# Patient Record
Sex: MALE | Race: OTHER | HISPANIC OR LATINO
[De-identification: names, ages, dates, MRNs, and addresses within clinical notes are randomized per-mention and may not be internally consistent; named-entity substitution may affect disease eponyms.]

---

## 2020-09-05 ENCOUNTER — TRANSCRIPTION ENCOUNTER (OUTPATIENT)
Age: 55
End: 2020-09-05

## 2020-09-24 ENCOUNTER — EMERGENCY (EMERGENCY)
Facility: HOSPITAL | Age: 55
LOS: 1 days | Discharge: ANOTHER HOSPITAL | End: 2020-09-24
Attending: EMERGENCY MEDICINE | Admitting: EMERGENCY MEDICINE
Payer: COMMERCIAL

## 2020-09-24 ENCOUNTER — EMERGENCY (EMERGENCY)
Facility: HOSPITAL | Age: 55
LOS: 1 days | Discharge: HOME | End: 2020-09-24
Attending: EMERGENCY MEDICINE | Admitting: EMERGENCY MEDICINE
Payer: COMMERCIAL

## 2020-09-24 VITALS
OXYGEN SATURATION: 96 % | HEIGHT: 71 IN | TEMPERATURE: 99 F | HEART RATE: 66 BPM | SYSTOLIC BLOOD PRESSURE: 135 MMHG | RESPIRATION RATE: 15 BRPM | WEIGHT: 166.89 LBS | DIASTOLIC BLOOD PRESSURE: 88 MMHG

## 2020-09-24 VITALS
TEMPERATURE: 100 F | RESPIRATION RATE: 17 BRPM | HEART RATE: 70 BPM | OXYGEN SATURATION: 97 % | SYSTOLIC BLOOD PRESSURE: 162 MMHG | DIASTOLIC BLOOD PRESSURE: 99 MMHG

## 2020-09-24 VITALS
OXYGEN SATURATION: 97 % | HEIGHT: 71 IN | RESPIRATION RATE: 18 BRPM | DIASTOLIC BLOOD PRESSURE: 85 MMHG | HEART RATE: 70 BPM | WEIGHT: 166.89 LBS | TEMPERATURE: 100 F | SYSTOLIC BLOOD PRESSURE: 135 MMHG

## 2020-09-24 DIAGNOSIS — J32.9 CHRONIC SINUSITIS, UNSPECIFIED: ICD-10-CM

## 2020-09-24 DIAGNOSIS — Z20.828 CONTACT WITH AND (SUSPECTED) EXPOSURE TO OTHER VIRAL COMMUNICABLE DISEASES: ICD-10-CM

## 2020-09-24 DIAGNOSIS — R22.0 LOCALIZED SWELLING, MASS AND LUMP, HEAD: ICD-10-CM

## 2020-09-24 DIAGNOSIS — R50.9 FEVER, UNSPECIFIED: ICD-10-CM

## 2020-09-24 LAB
ANION GAP SERPL CALC-SCNC: 9 MMOL/L — SIGNIFICANT CHANGE UP (ref 9–16)
BASOPHILS # BLD AUTO: 0.04 K/UL — SIGNIFICANT CHANGE UP (ref 0–0.2)
BASOPHILS NFR BLD AUTO: 0.3 % — SIGNIFICANT CHANGE UP (ref 0–2)
BUN SERPL-MCNC: 15 MG/DL — SIGNIFICANT CHANGE UP (ref 7–23)
CALCIUM SERPL-MCNC: 9.5 MG/DL — SIGNIFICANT CHANGE UP (ref 8.5–10.5)
CHLORIDE SERPL-SCNC: 101 MMOL/L — SIGNIFICANT CHANGE UP (ref 96–108)
CO2 SERPL-SCNC: 29 MMOL/L — SIGNIFICANT CHANGE UP (ref 22–31)
CREAT SERPL-MCNC: 1.26 MG/DL — SIGNIFICANT CHANGE UP (ref 0.5–1.3)
EOSINOPHIL # BLD AUTO: 0.03 K/UL — SIGNIFICANT CHANGE UP (ref 0–0.5)
EOSINOPHIL NFR BLD AUTO: 0.2 % — SIGNIFICANT CHANGE UP (ref 0–6)
GLUCOSE SERPL-MCNC: 115 MG/DL — HIGH (ref 70–99)
HCT VFR BLD CALC: 44.8 % — SIGNIFICANT CHANGE UP (ref 39–50)
HGB BLD-MCNC: 14.9 G/DL — SIGNIFICANT CHANGE UP (ref 13–17)
IMM GRANULOCYTES NFR BLD AUTO: 0.4 % — SIGNIFICANT CHANGE UP (ref 0–1.5)
LYMPHOCYTES # BLD AUTO: 2.73 K/UL — SIGNIFICANT CHANGE UP (ref 1–3.3)
LYMPHOCYTES # BLD AUTO: 20.1 % — SIGNIFICANT CHANGE UP (ref 13–44)
MCHC RBC-ENTMCNC: 31.2 PG — SIGNIFICANT CHANGE UP (ref 27–34)
MCHC RBC-ENTMCNC: 33.3 GM/DL — SIGNIFICANT CHANGE UP (ref 32–36)
MCV RBC AUTO: 93.7 FL — SIGNIFICANT CHANGE UP (ref 80–100)
MONOCYTES # BLD AUTO: 1.14 K/UL — HIGH (ref 0–0.9)
MONOCYTES NFR BLD AUTO: 8.4 % — SIGNIFICANT CHANGE UP (ref 2–14)
NEUTROPHILS # BLD AUTO: 9.6 K/UL — HIGH (ref 1.8–7.4)
NEUTROPHILS NFR BLD AUTO: 70.6 % — SIGNIFICANT CHANGE UP (ref 43–77)
NRBC # BLD: 0 /100 WBCS — SIGNIFICANT CHANGE UP (ref 0–0)
PLATELET # BLD AUTO: 173 K/UL — SIGNIFICANT CHANGE UP (ref 150–400)
POTASSIUM SERPL-MCNC: 4.2 MMOL/L — SIGNIFICANT CHANGE UP (ref 3.5–5.3)
POTASSIUM SERPL-SCNC: 4.2 MMOL/L — SIGNIFICANT CHANGE UP (ref 3.5–5.3)
RBC # BLD: 4.78 M/UL — SIGNIFICANT CHANGE UP (ref 4.2–5.8)
RBC # FLD: 12.1 % — SIGNIFICANT CHANGE UP (ref 10.3–14.5)
SARS-COV-2 RNA SPEC QL NAA+PROBE: SIGNIFICANT CHANGE UP
SODIUM SERPL-SCNC: 139 MMOL/L — SIGNIFICANT CHANGE UP (ref 132–145)
WBC # BLD: 13.59 K/UL — HIGH (ref 3.8–10.5)
WBC # FLD AUTO: 13.59 K/UL — HIGH (ref 3.8–10.5)

## 2020-09-24 PROCEDURE — 70470 CT HEAD/BRAIN W/O & W/DYE: CPT | Mod: 26

## 2020-09-24 PROCEDURE — 99285 EMERGENCY DEPT VISIT HI MDM: CPT

## 2020-09-24 PROCEDURE — 99284 EMERGENCY DEPT VISIT MOD MDM: CPT

## 2020-09-24 PROCEDURE — 70487 CT MAXILLOFACIAL W/DYE: CPT | Mod: 26

## 2020-09-24 RX ORDER — SODIUM CHLORIDE 9 MG/ML
1000 INJECTION INTRAMUSCULAR; INTRAVENOUS; SUBCUTANEOUS ONCE
Refills: 0 | Status: COMPLETED | OUTPATIENT
Start: 2020-09-24 | End: 2020-09-24

## 2020-09-24 RX ORDER — VANCOMYCIN HCL 1 G
1250 VIAL (EA) INTRAVENOUS ONCE
Refills: 0 | Status: COMPLETED | OUTPATIENT
Start: 2020-09-24 | End: 2020-09-24

## 2020-09-24 RX ORDER — ACETAMINOPHEN 500 MG
975 TABLET ORAL ONCE
Refills: 0 | Status: COMPLETED | OUTPATIENT
Start: 2020-09-24 | End: 2020-09-24

## 2020-09-24 RX ADMIN — SODIUM CHLORIDE 1000 MILLILITER(S): 9 INJECTION INTRAMUSCULAR; INTRAVENOUS; SUBCUTANEOUS at 20:14

## 2020-09-24 RX ADMIN — SODIUM CHLORIDE 1000 MILLILITER(S): 9 INJECTION INTRAMUSCULAR; INTRAVENOUS; SUBCUTANEOUS at 18:15

## 2020-09-24 RX ADMIN — Medication 1250 MILLIGRAM(S): at 22:14

## 2020-09-24 RX ADMIN — Medication 166.67 MILLIGRAM(S): at 18:53

## 2020-09-24 RX ADMIN — Medication 975 MILLIGRAM(S): at 22:14

## 2020-09-24 NOTE — ED PROVIDER NOTE - CLINICAL SUMMARY MEDICAL DECISION MAKING FREE TEXT BOX
signed out to me -- nasal abscess with cellulitis, wbc 13k, covered with IV vancomycin, spoke with ENT fellow Dr. Alba, he recommended transfer to [facility] ED for eval, discussed with Dr. Boss who accepted patient for transfer. patient agrees with plan.

## 2020-09-24 NOTE — ED ADULT NURSE NOTE - OBJECTIVE STATEMENT
Received pt on stretcher no apparent distress. BIBA transferred from [facility] for ENT consult. Pt noted swelling and redness on his nose - CT was done - +abscess transferred here for ENT consult. No fever. with PIV on Rt AC g.20. Meds started in [facility] per notes,  -Pending MD canas.  -Plan of care explained.  -comfort and safety measures maintained.

## 2020-09-24 NOTE — ED ADULT NURSE NOTE - OTHER COMPLAINTS
pt transferred from [facility] for nasal abscess, pt reports nasal swelling and congestion and fevers x5 days, denies any injury to the site, speaking in full sentences, no respiratory distress present

## 2020-09-24 NOTE — ED PROVIDER NOTE - ATTENDING CONTRIBUTION TO CARE
Patient presenting with nasal swelling/redness and fever x 2d. VSS. + nasal swelling/ cellulitis. CTAP shows abscess. Covered with IV abx. TFR to [facility] ED for ENT eval.

## 2020-09-24 NOTE — ED CLERICAL - NS ED CLERK NOTE PRE-ARRIVAL INFORMATION; ADDITIONAL PRE-ARRIVAL INFORMATION
from [facility] for ENT for nasal abscess drainage. [facility] d/w ENT Dr. Mclaughlin

## 2020-09-24 NOTE — ED ADULT TRIAGE NOTE - OTHER COMPLAINTS
pt transferred from [facility] for nasal abscess, pt reports nasal swelling and congestion and fevers x5 days, denies any injury to the site, speaking in full sentences, no respiratory distress present

## 2020-09-24 NOTE — ED PROVIDER NOTE - ENMT, MLM
The patient's nose is diffusely swollen, erythematous and tender with crusting and scabbing along the medial aspect of the right naris.

## 2020-09-25 ENCOUNTER — APPOINTMENT (OUTPATIENT)
Dept: OTOLARYNGOLOGY | Facility: CLINIC | Age: 55
End: 2020-09-25

## 2020-09-25 ENCOUNTER — APPOINTMENT (OUTPATIENT)
Dept: OTOLARYNGOLOGY | Facility: CLINIC | Age: 55
End: 2020-09-25
Payer: COMMERCIAL

## 2020-09-25 VITALS
TEMPERATURE: 98.4 F | HEIGHT: 71 IN | WEIGHT: 167 LBS | OXYGEN SATURATION: 98 % | SYSTOLIC BLOOD PRESSURE: 135 MMHG | HEART RATE: 68 BPM | DIASTOLIC BLOOD PRESSURE: 87 MMHG | BODY MASS INDEX: 23.38 KG/M2

## 2020-09-25 DIAGNOSIS — B20 HUMAN IMMUNODEFICIENCY VIRUS [HIV] DISEASE: ICD-10-CM

## 2020-09-25 DIAGNOSIS — Z78.9 OTHER SPECIFIED HEALTH STATUS: ICD-10-CM

## 2020-09-25 DIAGNOSIS — Z83.3 FAMILY HISTORY OF DIABETES MELLITUS: ICD-10-CM

## 2020-09-25 PROBLEM — Z00.00 ENCOUNTER FOR PREVENTIVE HEALTH EXAMINATION: Status: ACTIVE | Noted: 2020-09-25

## 2020-09-25 PROCEDURE — 99283 EMERGENCY DEPT VISIT LOW MDM: CPT

## 2020-09-25 PROCEDURE — 31231 NASAL ENDOSCOPY DX: CPT

## 2020-09-25 PROCEDURE — 99204 OFFICE O/P NEW MOD 45 MIN: CPT | Mod: 25

## 2020-09-25 RX ORDER — MOXIFLOXACIN HYDROCHLORIDE TABLETS, 400 MG 400 MG/1
1 TABLET, FILM COATED ORAL
Qty: 20 | Refills: 0
Start: 2020-09-25 | End: 2020-10-04

## 2020-09-25 RX ORDER — IBUPROFEN 200 MG
400 TABLET ORAL ONCE
Refills: 0 | Status: DISCONTINUED | OUTPATIENT
Start: 2020-09-25 | End: 2020-09-28

## 2020-09-25 RX ORDER — HYDROCODONE BITARTRATE AND ACETAMINOPHEN 5; 325 MG/1; MG/1
5-325 TABLET ORAL
Qty: 30 | Refills: 0 | Status: ACTIVE | COMMUNITY
Start: 2020-09-25 | End: 1900-01-01

## 2020-09-25 RX ORDER — AZTREONAM 2 G
1 VIAL (EA) INJECTION
Qty: 20 | Refills: 0
Start: 2020-09-25 | End: 2020-10-04

## 2020-09-25 RX ORDER — BICTEGRAVIR SODIUM, EMTRICITABINE, AND TENOFOVIR ALAFENAMIDE FUMARATE 30; 120; 15 MG/1; MG/1; MG/1
TABLET ORAL
Refills: 0 | Status: ACTIVE | COMMUNITY

## 2020-09-25 RX ORDER — CIPROFLOXACIN LACTATE 400MG/40ML
500 VIAL (ML) INTRAVENOUS ONCE
Refills: 0 | Status: COMPLETED | OUTPATIENT
Start: 2020-09-25 | End: 2020-09-25

## 2020-09-25 RX ORDER — CLINDAMYCIN HYDROCHLORIDE 150 MG/1
150 CAPSULE ORAL EVERY 6 HOURS
Qty: 120 | Refills: 0 | Status: ACTIVE | COMMUNITY
Start: 2020-09-25 | End: 1900-01-01

## 2020-09-25 RX ADMIN — Medication 500 MILLIGRAM(S): at 00:35

## 2020-09-25 RX ADMIN — Medication 1 TABLET(S): at 00:35

## 2020-09-25 NOTE — PHYSICAL EXAM
[Nasal Endoscopy Performed] : nasal endoscopy was performed, see procedure section for findings [de-identified] : prominent erythema/edema for sev cm around the nasal tip; packing tape removed from a R nasal sill incision & tip repacked [Normal] : orientation to person, place, and time: normal [FreeTextEntry2] : as noted [de-identified] : as noted

## 2020-09-25 NOTE — DATA REVIEWED
[de-identified] : ER results: elevated WBC w/ a left shift; no gram stain found [de-identified] : CT 9/24/20: Nasal tip & columella abscess

## 2020-09-25 NOTE — CONSULT NOTE ADULT - SUBJECTIVE AND OBJECTIVE BOX
CC: nasal abscess    HPI: 55M w/ Pmhx of HIV (on HAART, last VL undetectable) presenting with 3 days of nose progressive edema and erythema to [facility] ED.  pt underwent CT scan there and was found to have a small collection in the nasal tip.  Pt was transferred to [facility] ED for further management.  Pt states that he is not sure how the nose swelling starting and denies known pimple or skin cut. Pt denies further episodes of abscess and any abx treatment for this event prior to presentation.  At time of ENT evaluation at bedside, pt was AFVSS and auto draining purulence from right nasal ala.      PAST MEDICAL & SURGICAL HISTORY:  HIV (human immunodeficiency virus infection)      Allergies    No Known Allergies    Intolerances      MEDICATIONS  (STANDING):    MEDICATIONS  (PRN):      Social History: denies toxic habits X 3    ROS:   ENT: all negative except as noted in HPI     Vital Signs Last 24 Hrs  T(C): 37.6 (24 Sep 2020 22:40), Max: 38 (24 Sep 2020 22:17)  T(F): 99.6 (24 Sep 2020 22:40), Max: 100.4 (24 Sep 2020 22:17)  HR: 70 (24 Sep 2020 22:40) (66 - 70)  BP: 135/85 (24 Sep 2020 22:40) (135/85 - 162/99)  BP(mean): --  RR: 18 (24 Sep 2020 22:40) (15 - 18)  SpO2: 97% (24 Sep 2020 22:40) (96% - 98%)                          14.9   13.59 )-----------( 173      ( 24 Sep 2020 18:20 )             44.8    09-24    139  |  101  |  15  ----------------------------<  115<H>  4.2   |  29  |  1.26    Ca    9.5      24 Sep 2020 18:20         PHYSICAL EXAM:  Gen: NAD, A+OX3 and mentating well  Skin: skin overlying nose erythematous and edematous  Head: Normocephalic, Atraumatic  Face: edema and erythema of nasal tip with fluctuance. Parotid glands soft without mass  Eyes: no scleral injection  Nose: right nare with crusting and auto-draining of purulence and nasal tip fluctuance.  skin overlying nasal tip edematous and erythematous.  no septal fluctuance.    Mouth: No Stridor / Drooling / Trismus.  Mucosa moist, tongue/uvula midline, oropharynx clear  Neck: Flat, supple, no lymphadenopathy, trachea midline, no masses  Lymphatic: No lymphadenopathy  Resp: breathing easily, no stridor  Neuro: facial nerve intact, no facial droop    Procedure: Wound exploration and decompression of abscess.  R/B/A of procedure discussed at length and all questions answered and patient agreed to proceed.  Pt prepped and draped in sterile fashion with betadine solution.  2cc of lidocaine 2% with 1:100,000 epinephrine injected in a wheel surrounding area od auto-draining.  crusting removed and wound probed with a sterile cotton swab.  2 cc of purulence expressed and sent for culture.  2cm wick of iodoform placed into wound to prevent closure and promote drainage.  Pt tolerated procedure well without any complications.      IMAGING/ADDITIONAL STUDIES: Anterior nasal abscess, as above, with surrounding cellulitis. No nasal septal involvement, aside from the columella. No deep facial or orbital infection. No bony erosion/destruction.    A/P: 55 M w/ HIV well controlled and anterior nasal abscess now s/p decompression and packing.  pt AFVSS.   - Recommend mupirocen ointment to nares   - recommend warm compresses to nose   - Recommend PO abx with MRSA coverage and Ciprofloxacin   - Pt offered admission but preferred outpatient treatment at this time.   Recommend close follow up with Dr. Davis tomorrow - pt to call  in AM.    - Strict ED return precautions including Fever, change in mentation, progressing abscess, etc   - Please page ENT with any questions or concerns.

## 2020-09-25 NOTE — ASSESSMENT
[FreeTextEntry1] : Discussed reasons to go straight to the ER over the weekend; f/u 2d. Materials provided for BID packing changes\par  Reference #: [id number]

## 2020-09-25 NOTE — PROCEDURE
[FreeTextEntry6] : Indication: requirement for exam not possible via anterior rhinoscopy; abscess\par After verbal consent and the administration of an aerosolized phenylephrine/lidocaine mix, examination was performed with a flexible endoscope. Findings:\par Septum: bilat devn\par Mucosa: normal\par Polyposis: not present\par Inferior turbinates: unremarkable\par Middle and superior turbinates: normal\par Inferior meatus: unremarkable\par Middle meatus: unremarkable\par Superior meatus: unremarkable\par Speno-ethmoidal recess: unremarkable\par Nasopharynx: unremarkable\par Secretions: dried purulent anterior only

## 2020-09-25 NOTE — ED PROVIDER NOTE - CARE PROVIDER_API CALL
Mitzi Dvais  OTOLARYNGOLOGY  [address] [place] [address], [address]  [place], [place] [address]  Phone: [phone]  Fax: [phone]  Follow Up Time:

## 2020-09-25 NOTE — ED PROVIDER NOTE - PATIENT PORTAL LINK FT
You can access the FollowMyHealth Patient Portal offered by [facility] by registering at the following website: [email or web address]. By joining [employer] [employer]’s FollowMyHealth portal, you will also be able to view your health information using other applications (apps) compatible with our system.

## 2020-09-25 NOTE — ED PROVIDER NOTE - PHYSICAL EXAMINATION
CONSTITUTIONAL: Awake, alert and in no apparent distress.  HEENT: Head is atraumatic. Eyes clear bilaterally, normal EOMI. Airway patent. swelling to tip of nose w erythema, no septal fluctuance  CARDIAC: Normal rate, regular rhythm.  Heart sounds S1, S2.   RESPIRATORY: Breath sounds clear and equal bilaterally. no tachypnea, respiratory distress.   GASTROINTESTINAL: Abdomen soft, non-tender, no guarding, distension.  MUSCULOSKELETAL: Spine appears normal, no midline spinal tenderness, range of motion is not limited, no muscle or joint tenderness. no bony tenderness.   NEUROLOGICAL: Alert, no focal deficits, no motor or sensory deficits.  SKIN: Skin normal color for race, warm, dry and intact. No evidence of rash.  PSYCHIATRIC: Normal mood and affect. no apparent risk to self or others.

## 2020-09-25 NOTE — HISTORY OF PRESENT ILLNESS
[de-identified] : 3d ago developed pain & swelling of his nose which progressed until he went to the ER yesterday where a scan was done and he reports that he had 3cc of purulence liberated from his nasal tip via an intranasal incision. He was given IV abx x2. He has not started orals but has a rx; fevers yesterday but not today and the redness & swelling has decreaed significantly. Hx of some sinus infections but none in the past year. In general no nasal dyspnea except for some springtime allergies. \par HIV pos on Biktarvy and reports that his viral load was undetectable late last year. \par \par Covid-19 screening questions: \par   Sick contacts: no\par   Recent international travel: no\par   Shortness of breath: no\par   New or productive cough: no\par   Fevers/chills/night sweats: no\par   COVID-19 testing: neg swab & Ab 3 wks ago

## 2020-09-25 NOTE — ED PROVIDER NOTE - OBJECTIVE STATEMENT
hx of HIV, VLUD referred to ED for concern of nasal abscess, pt states redness to tip of nose for one day w assoc drainage, no f/c, headache, neck pain, Pt has not tried anything for symptoms, no other aggravating or relieving factors.

## 2020-09-28 ENCOUNTER — APPOINTMENT (OUTPATIENT)
Dept: OTOLARYNGOLOGY | Facility: CLINIC | Age: 55
End: 2020-09-28
Payer: COMMERCIAL

## 2020-09-28 VITALS
OXYGEN SATURATION: 98 % | TEMPERATURE: 98.3 F | BODY MASS INDEX: 23.38 KG/M2 | HEIGHT: 71 IN | WEIGHT: 167 LBS | DIASTOLIC BLOOD PRESSURE: 69 MMHG | SYSTOLIC BLOOD PRESSURE: 110 MMHG | HEART RATE: 64 BPM

## 2020-09-28 PROCEDURE — 99214 OFFICE O/P EST MOD 30 MIN: CPT

## 2020-09-28 NOTE — HISTORY OF PRESENT ILLNESS
[de-identified] : f/u a nasal tip MRSA abscess & cellulitis that began 6d ago & was packed in the ED; he's been doing BID packing changes but was unable to continue this morning. His pain level has decreased tremendously w/ less erythema.. Hx of some sinus infections but none in the past year. In general no nasal dyspnea except for some springtime allergies. \par HIV pos on Biktarvy and reports that his viral load was undetectable late last year. \par \par Covid-19 screening questions: \par   Sick contacts: no\par   Recent international travel: no\par   Shortness of breath: no\par   New or productive cough: no\par   Fevers/chills/night sweats: no\par   COVID-19 testing: neg swab & Ab 3 wks ago

## 2020-09-28 NOTE — ASSESSMENT
[FreeTextEntry1] : Improving significantly; may stop packing now but continue Clinda. Discussed home hygeine measures.

## 2020-09-28 NOTE — PHYSICAL EXAM
[de-identified] : much improved erythema/edema of the nasal tip & no further drg from the R sill incision site. Mild desquamation of the tip.  [Normal] : orientation to person, place, and time: normal [FreeTextEntry2] : as noted [de-identified] : as noted

## 2020-09-28 NOTE — DATA REVIEWED
[de-identified] : ER results: elevated WBC w/ a left shift; no gram stain found\par Cx 9/25: MRSA sensi to clinda [de-identified] : CT 9/24/20: Nasal tip & columella abscess

## 2020-09-30 PROBLEM — B20 HUMAN IMMUNODEFICIENCY VIRUS [HIV] DISEASE: Chronic | Status: ACTIVE | Noted: 2020-09-24

## 2020-10-02 LAB — BACTERIA WND CULT: ABNORMAL

## 2020-10-05 ENCOUNTER — APPOINTMENT (OUTPATIENT)
Dept: OTOLARYNGOLOGY | Facility: CLINIC | Age: 55
End: 2020-10-05
Payer: COMMERCIAL

## 2020-10-05 VITALS
HEART RATE: 55 BPM | WEIGHT: 167 LBS | BODY MASS INDEX: 23.38 KG/M2 | SYSTOLIC BLOOD PRESSURE: 121 MMHG | HEIGHT: 71 IN | TEMPERATURE: 97.5 F | OXYGEN SATURATION: 99 % | DIASTOLIC BLOOD PRESSURE: 82 MMHG

## 2020-10-05 DIAGNOSIS — L02.01 CELLULITIS OF FACE: ICD-10-CM

## 2020-10-05 DIAGNOSIS — L03.211 CELLULITIS OF FACE: ICD-10-CM

## 2020-10-05 PROCEDURE — 99214 OFFICE O/P EST MOD 30 MIN: CPT

## 2020-10-05 RX ORDER — CHLORHEXIDINE GLUCONATE 2 G/100ML
2 SOLUTION TOPICAL
Qty: 2 | Refills: 0 | Status: ACTIVE | COMMUNITY
Start: 2020-10-05 | End: 1900-01-01

## 2020-10-05 RX ORDER — MUPIROCIN 20 MG/G
2 OINTMENT TOPICAL
Qty: 1 | Refills: 0 | Status: ACTIVE | COMMUNITY
Start: 2020-10-05 | End: 1900-01-01

## 2020-10-05 NOTE — DATA REVIEWED
[de-identified] : ER results: elevated WBC w/ a left shift; no gram stain found\par Cx 9/25: MRSA sensi to clinda [de-identified] : CT 9/24/20: Nasal tip & columella abscess

## 2020-10-05 NOTE — PHYSICAL EXAM
[de-identified] : mild remaining erythema & ttp [Normal] : orientation to person, place, and time: normal [FreeTextEntry2] : as noted [de-identified] : as noted

## 2020-10-05 NOTE — HISTORY OF PRESENT ILLNESS
[de-identified] : f/u a nasal tip MRSA abscess & cellulitis that began 9/22/20 & was packed in the ED; he's no longer packing and the shape of his nose has returned to normal with only a little tenderness to palpation. Hx of some sinus infections but none in the past year. In general no nasal dyspnea except for some springtime allergies. \par HIV pos on Biktarvy and reports that his viral load was undetectable late last year. \par \par Covid-19 screening questions: \par   Sick contacts: no\par   Recent international travel: no\par   Shortness of breath: no\par   New or productive cough: no\par   Fevers/chills/night sweats: no\par   COVID-19 testing: neg swab & Ab 3 wks ago

## 2020-10-10 ENCOUNTER — TRANSCRIPTION ENCOUNTER (OUTPATIENT)
Age: 55
End: 2020-10-10
